# Patient Record
(demographics unavailable — no encounter records)

---

## 2024-12-17 NOTE — ASSESSMENT
[FreeTextEntry1] : Impression: Likely topless tibia fracture right.  As he is quite uncomfortable with obvious swelling and tenderness to the tibial shaft he has been placed into a molded fiberglass short leg cast uneventfully.  I will discuss cast care and act duties with mother will return in 2 and half weeks with x-rays of the right tibia

## 2024-12-17 NOTE — HISTORY OF PRESENT ILLNESS
[FreeTextEntry1] : This 21-month-old healthy child with normal development is seen for evaluation of the right lower extremity.  He was well until this past weekend when he sustained injury playing on a trampoline.  Subsequent to that she was noted to be in pain with significant difficulty bearing weight.  He was seen at [facility] x-rays were taken no obvious fractures were noted.  He still is uncomfortable past history is noncontributory

## 2024-12-17 NOTE — PHYSICAL EXAM
[FreeTextEntry1] : Exam today reveals he will bear weight however it is with significant discomfort and an antalgic gait on the right side he has full motion to the right hip knee ankle and foot no swelling or tenderness to the thigh he does have swelling and tenderness to the right tibial shaft compartments are soft neurovascular status is intact.  Review of x-rays [facility] September 15, 2004 right hip and lower extremity x-rays revealed no obvious fracture

## 2025-01-03 NOTE — ASSESSMENT
[FreeTextEntry1] : Impression: Status post minor fracture right tibia.  The cast has been removed uneventfully no significant tenderness or swelling is noted on palpation.  He will begin motion and weightbearing as he tolerates mom has been made aware he will limp on the order of 2-3 weeks no playground activities until the limp resolves he will return on a as needed basis

## 2025-01-03 NOTE — PHYSICAL EXAM
[FreeTextEntry1] : Examination today he is comfortable in his well-fitting short leg cast no foul smell no swelling moving his toes well he has been walking on the cast.  X-rays ordered and taken today of the right tibia 2 views are satisfactory with regards to a very minor distal fracture.

## 2025-01-03 NOTE — HISTORY OF PRESENT ILLNESS
[FreeTextEntry1] : This 22-month-old returns for follow-up of his right lower extremity.  Comfortable in the cast no complaints from mother